# Patient Record
Sex: FEMALE | URBAN - METROPOLITAN AREA
[De-identification: names, ages, dates, MRNs, and addresses within clinical notes are randomized per-mention and may not be internally consistent; named-entity substitution may affect disease eponyms.]

---

## 2020-08-28 ENCOUNTER — HOSPITAL ENCOUNTER (OUTPATIENT)
Facility: MEDICAL CENTER | Age: 25
End: 2020-08-28
Attending: SPECIALIST
Payer: MEDICAID

## 2020-08-28 PROCEDURE — 87491 CHLMYD TRACH DNA AMP PROBE: CPT

## 2020-08-28 PROCEDURE — 87591 N.GONORRHOEAE DNA AMP PROB: CPT

## 2020-08-28 PROCEDURE — 88175 CYTOPATH C/V AUTO FLUID REDO: CPT

## 2020-08-28 PROCEDURE — 87624 HPV HI-RISK TYP POOLED RSLT: CPT

## 2020-08-31 LAB
C TRACH DNA GENITAL QL NAA+PROBE: NEGATIVE
CYTOLOGY REG CYTOL: NORMAL
HPV HR 12 DNA CVX QL NAA+PROBE: NEGATIVE
HPV16 DNA SPEC QL NAA+PROBE: NEGATIVE
HPV18 DNA SPEC QL NAA+PROBE: NEGATIVE
N GONORRHOEA DNA GENITAL QL NAA+PROBE: NEGATIVE
SPECIMEN SOURCE: NORMAL
SPECIMEN SOURCE: NORMAL

## 2023-01-23 ENCOUNTER — TELEPHONE (OUTPATIENT)
Dept: SCHEDULING | Facility: IMAGING CENTER | Age: 28
End: 2023-01-23

## 2023-01-31 ENCOUNTER — OFFICE VISIT (OUTPATIENT)
Dept: MEDICAL GROUP | Facility: CLINIC | Age: 28
End: 2023-01-31
Payer: MEDICAID

## 2023-01-31 VITALS
DIASTOLIC BLOOD PRESSURE: 60 MMHG | RESPIRATION RATE: 16 BRPM | WEIGHT: 138 LBS | SYSTOLIC BLOOD PRESSURE: 100 MMHG | OXYGEN SATURATION: 100 % | HEART RATE: 102 BPM | HEIGHT: 62 IN | BODY MASS INDEX: 25.4 KG/M2 | TEMPERATURE: 97.6 F

## 2023-01-31 DIAGNOSIS — E55.9 VITAMIN D DEFICIENCY: ICD-10-CM

## 2023-01-31 DIAGNOSIS — G43.909 MIGRAINE WITHOUT STATUS MIGRAINOSUS, NOT INTRACTABLE, UNSPECIFIED MIGRAINE TYPE: ICD-10-CM

## 2023-01-31 DIAGNOSIS — Z00.00 ROUTINE PHYSICAL EXAMINATION: ICD-10-CM

## 2023-01-31 DIAGNOSIS — Z23 NEED FOR VACCINATION: ICD-10-CM

## 2023-01-31 DIAGNOSIS — Z11.59 ENCOUNTER FOR HEPATITIS C SCREENING TEST FOR LOW RISK PATIENT: ICD-10-CM

## 2023-01-31 PROBLEM — R44.1 HALLUCINATIONS, VISUAL: Status: RESOLVED | Noted: 2017-06-14 | Resolved: 2023-01-31

## 2023-01-31 PROBLEM — F43.10 POSTTRAUMATIC STRESS DISORDER: Status: ACTIVE | Noted: 2017-06-14

## 2023-01-31 PROBLEM — F43.10 POSTTRAUMATIC STRESS DISORDER: Status: RESOLVED | Noted: 2017-06-14 | Resolved: 2023-01-31

## 2023-01-31 PROBLEM — R44.1 HALLUCINATIONS, VISUAL: Status: ACTIVE | Noted: 2017-06-14

## 2023-01-31 PROBLEM — J45.990 EXERCISE-INDUCED ASTHMA: Status: ACTIVE | Noted: 2017-06-14

## 2023-01-31 PROCEDURE — 99204 OFFICE O/P NEW MOD 45 MIN: CPT | Mod: 25 | Performed by: PHYSICIAN ASSISTANT

## 2023-01-31 PROCEDURE — 90686 IIV4 VACC NO PRSV 0.5 ML IM: CPT | Performed by: PHYSICIAN ASSISTANT

## 2023-01-31 PROCEDURE — 90472 IMMUNIZATION ADMIN EACH ADD: CPT | Performed by: PHYSICIAN ASSISTANT

## 2023-01-31 PROCEDURE — 90677 PCV20 VACCINE IM: CPT | Performed by: PHYSICIAN ASSISTANT

## 2023-01-31 PROCEDURE — 90471 IMMUNIZATION ADMIN: CPT | Performed by: PHYSICIAN ASSISTANT

## 2023-01-31 RX ORDER — SUMATRIPTAN 50 MG/1
50 TABLET, FILM COATED ORAL
Qty: 10 TABLET | Refills: 0 | Status: SHIPPED | OUTPATIENT
Start: 2023-01-31 | End: 2023-02-14

## 2023-01-31 RX ORDER — LEVONORGESTREL AND ETHINYL ESTRADIOL 0.15-0.03
KIT ORAL
COMMUNITY
Start: 2023-01-19

## 2023-01-31 ASSESSMENT — PATIENT HEALTH QUESTIONNAIRE - PHQ9: CLINICAL INTERPRETATION OF PHQ2 SCORE: 0

## 2023-01-31 NOTE — PROGRESS NOTES
Chief Complaint   Patient presents with    [facility]     Headache behind R eye, 15 days       HPI:  Ghanshyam is a 27 y.o. female new patient presenting to [facility] and discuss the following:    Assessment/Plan:     Migraine without aura and without status migrainosus, not intractable  Patient states that she has had a headache behind her right eye and above her right eye for 15 days. She has tried multiple OTC pain relievers without improvement of symptoms. She states that sometimes an ice pack to the right side of her head will help ease the pain some but it comes right back. She states that this pain has not stopped at all in the last 15 days. She has no history of migraines but does have a history of Bell's palsy as a child. Will sive her some sumatriptan to see if that will help relieve the pain. CT with and without contrast has been ordered to see if there are any additional causes that can be found. She will follow up to review the results of the CT. She has been instructed that if the Imitrex does not help then she should stop taking it. Will consider referral to neurology after CT scan is complete.    Routine physical examination  Patient here today to [facility]. Due for routine fasting labs. Will follow up in 2 weeks with results.  States no previous PCP previous PCP. No medical records requested.      Patient Active Problem List    Diagnosis Date Noted    Routine physical examination 01/31/2023    Seasonal allergies     Anxiety     Endometriosis     History of Bell's palsy     Migraine without aura and without status migrainosus, not intractable     Exercise-induced asthma 06/14/2017       Current Outpatient Medications   Medication Sig Dispense Refill    KURVELO 0.15-30 MG-MCG per tablet TAKE 1 TABLET BY MOUTH ONCE DAILY FOR 63 DAYS . TAKE 21 DAYS OF ACTIVE TABS, THEN SKIP PLACEBOS AND START NEXT PACK      SUMAtriptan (IMITREX) 50 MG Tab Take 1 Tablet by mouth one time as needed for Migraine  for up to 1 dose. 10 Tablet 0     No current facility-administered medications for this visit.       Allergies as of 01/31/2023 - Reviewed 01/31/2023   Allergen Reaction Noted    Avocado Itching 01/31/2023        Social History     Socioeconomic History    Marital status: Single     Spouse name: Not on file    Number of children: 0    Years of education: Not on file    Highest education level: High school graduate   Occupational History    Occupation: [employer]     Employer: Self Employed   Tobacco Use    Smoking status: Some Days     Types: Cigarettes    Smokeless tobacco: Never   Vaping Use    Vaping Use: Never used   Substance and Sexual Activity    Alcohol use: Yes     Comment: occasionally    Drug use: Yes     Frequency: 21.0 times per week     Types: Marijuana     Comment: smokes a tobacco and marijuana mix    Sexual activity: Yes     Partners: Male   Other Topics Concern    Not on file   Social History Narrative    Not on file     Social Determinants of Health     Financial Resource Strain: Not on file   Food Insecurity: Not on file   Transportation Needs: Not on file   Physical Activity: Not on file   Stress: Not on file   Social Connections: Not on file   Intimate Partner Violence: Not on file   Housing Stability: Not on file       Family History   Problem Relation Age of Onset    Heart Disease Mother         MVP    Heart Disease Maternal Grandfather     Diabetes Paternal Grandfather     Cancer Neg Hx        Past Surgical History:   Procedure Laterality Date    GYN LAPAROSCOPY  08/01/2018    to diagnose and treat endometriosis    DENTAL EXTRACTION(S) Bilateral     wisdom teeth extraction       Review of Systems:   Constitutional: Negative for fever, chills, weight loss and malaise/fatigue.   HENT: Negative for ear pain, nosebleeds, congestion, sore throat and neck pain.    Eyes: Negative for blurred vision.   Respiratory: Negative for cough, sputum production, shortness of breath and wheezing.    Cardiovascular:  "Negative for chest pain, palpitations, orthopnea and leg swelling.   Gastrointestinal: Negative for heartburn, nausea, vomiting and abdominal pain.   Genitourinary: Negative for dysuria, urgency and frequency.   Musculoskeletal: Negative for myalgias, back pain and joint pain.   Skin: Negative for rash and itching.   Neurological: Positive for headache behind right eye for 15 days. Negative for dizziness, tingling, tremors, sensory change, focal weakness.   Endo/Heme/Allergies: Does not bruise/bleed easily.   Psychiatric/Behavioral: Negative for depression, suicidal ideas and memory loss.  The patient is not nervous/anxious and does not have insomnia.    All other systems reviewed and are negative except as in HPI.    Wt Readings from Last 3 Encounters:   01/31/23 62.6 kg (138 lb)   ]  No LMP recorded (lmp unknown). (Menstrual status: Other).    Exam:  /60 (BP Location: Left arm, Patient Position: Sitting, BP Cuff Size: Small adult)   Pulse (!) 102   Temp 36.4 °C (97.6 °F) (Temporal)   Resp 16   Ht 1.575 m (5' 2\")   Wt 62.6 kg (138 lb)   SpO2 100%  Body mass index is 25.24 kg/m².   General:  Well nourished, well developed female. No apparent distress. Not ill appearing.  Eyes: EOM intact, PERRL, conjunctiva non-injected, sclera non-icteric.  Neck: Supple with no cervical lymphadenopathy, JVD, palpable thyroid nodules or carotid bruits.  Pulmonary: Clear to ausculation bilaterally. Normal effort. No rales, ronchi, or wheezing.  Cardiovascular: Regular rate and rhythm without murmur, rub or gallop.   Extremities: Full range of motion. Warm and well perfused with no edema.  Skin: Intact with no obvious rashes or lesions.  Neuro: Cranial nerves I-XII grossly intact.  Psych: Alert and oriented x 3.  Appropriately dressed. Mood and affect appropriate.    Please note that this dictation was created using voice recognition software. I have made every reasonable attempt to correct obvious errors, but I expect " that there are errors of grammar and possibly content that I did not discover before finalizing the note.

## 2023-02-01 ENCOUNTER — HOSPITAL ENCOUNTER (OUTPATIENT)
Dept: LAB | Facility: MEDICAL CENTER | Age: 28
End: 2023-02-01
Attending: PHYSICIAN ASSISTANT
Payer: MEDICAID

## 2023-02-01 DIAGNOSIS — G43.909 MIGRAINE WITHOUT STATUS MIGRAINOSUS, NOT INTRACTABLE, UNSPECIFIED MIGRAINE TYPE: ICD-10-CM

## 2023-02-01 DIAGNOSIS — E55.9 VITAMIN D DEFICIENCY: ICD-10-CM

## 2023-02-01 DIAGNOSIS — Z00.00 ROUTINE PHYSICAL EXAMINATION: ICD-10-CM

## 2023-02-01 DIAGNOSIS — Z11.59 ENCOUNTER FOR HEPATITIS C SCREENING TEST FOR LOW RISK PATIENT: ICD-10-CM

## 2023-02-01 LAB
25(OH)D3 SERPL-MCNC: 24 NG/ML (ref 30–100)
ALBUMIN SERPL BCP-MCNC: 4.5 G/DL (ref 3.2–4.9)
ALBUMIN/GLOB SERPL: 1.4 G/DL
ALP SERPL-CCNC: 50 U/L (ref 30–99)
ALT SERPL-CCNC: 20 U/L (ref 2–50)
ANION GAP SERPL CALC-SCNC: 10 MMOL/L (ref 7–16)
AST SERPL-CCNC: 13 U/L (ref 12–45)
BASOPHILS # BLD AUTO: 0.6 % (ref 0–1.8)
BASOPHILS # BLD: 0.05 K/UL (ref 0–0.12)
BILIRUB SERPL-MCNC: 0.4 MG/DL (ref 0.1–1.5)
BUN SERPL-MCNC: 20 MG/DL (ref 8–22)
CALCIUM ALBUM COR SERPL-MCNC: 9 MG/DL (ref 8.5–10.5)
CALCIUM SERPL-MCNC: 9.4 MG/DL (ref 8.5–10.5)
CHLORIDE SERPL-SCNC: 110 MMOL/L (ref 96–112)
CO2 SERPL-SCNC: 21 MMOL/L (ref 20–33)
CREAT SERPL-MCNC: 0.88 MG/DL (ref 0.5–1.4)
EOSINOPHIL # BLD AUTO: 0.15 K/UL (ref 0–0.51)
EOSINOPHIL NFR BLD: 1.7 % (ref 0–6.9)
ERYTHROCYTE [DISTWIDTH] IN BLOOD BY AUTOMATED COUNT: 47.9 FL (ref 35.9–50)
GFR SERPLBLD CREATININE-BSD FMLA CKD-EPI: 92 ML/MIN/1.73 M 2
GLOBULIN SER CALC-MCNC: 3.3 G/DL (ref 1.9–3.5)
GLUCOSE SERPL-MCNC: 86 MG/DL (ref 65–99)
HCT VFR BLD AUTO: 46.6 % (ref 37–47)
HCV AB SER QL: NORMAL
HGB BLD-MCNC: 15.5 G/DL (ref 12–16)
IMM GRANULOCYTES # BLD AUTO: 0.02 K/UL (ref 0–0.11)
IMM GRANULOCYTES NFR BLD AUTO: 0.2 % (ref 0–0.9)
LYMPHOCYTES # BLD AUTO: 2.39 K/UL (ref 1–4.8)
LYMPHOCYTES NFR BLD: 26.9 % (ref 22–41)
MCH RBC QN AUTO: 32 PG (ref 27–33)
MCHC RBC AUTO-ENTMCNC: 33.3 G/DL (ref 33.6–35)
MCV RBC AUTO: 96.3 FL (ref 81.4–97.8)
MONOCYTES # BLD AUTO: 0.36 K/UL (ref 0–0.85)
MONOCYTES NFR BLD AUTO: 4 % (ref 0–13.4)
NEUTROPHILS # BLD AUTO: 5.93 K/UL (ref 2–7.15)
NEUTROPHILS NFR BLD: 66.6 % (ref 44–72)
NRBC # BLD AUTO: 0 K/UL
NRBC BLD-RTO: 0 /100 WBC
PLATELET # BLD AUTO: 171 K/UL (ref 164–446)
PMV BLD AUTO: 9.8 FL (ref 9–12.9)
POTASSIUM SERPL-SCNC: 4.3 MMOL/L (ref 3.6–5.5)
PROT SERPL-MCNC: 7.8 G/DL (ref 6–8.2)
RBC # BLD AUTO: 4.84 M/UL (ref 4.2–5.4)
SODIUM SERPL-SCNC: 141 MMOL/L (ref 135–145)
T4 FREE SERPL-MCNC: 1.26 NG/DL (ref 0.93–1.7)
TSH SERPL DL<=0.005 MIU/L-ACNC: 1.7 UIU/ML (ref 0.38–5.33)
WBC # BLD AUTO: 8.9 K/UL (ref 4.8–10.8)

## 2023-02-01 PROCEDURE — 82306 VITAMIN D 25 HYDROXY: CPT

## 2023-02-01 PROCEDURE — 86803 HEPATITIS C AB TEST: CPT

## 2023-02-01 PROCEDURE — 36415 COLL VENOUS BLD VENIPUNCTURE: CPT

## 2023-02-01 PROCEDURE — 84439 ASSAY OF FREE THYROXINE: CPT

## 2023-02-01 PROCEDURE — 80053 COMPREHEN METABOLIC PANEL: CPT

## 2023-02-01 PROCEDURE — 84443 ASSAY THYROID STIM HORMONE: CPT

## 2023-02-01 PROCEDURE — 85025 COMPLETE CBC W/AUTO DIFF WBC: CPT

## 2023-02-01 NOTE — ASSESSMENT & PLAN NOTE
Patient states that she has had a headache behind her right eye and above her right eye for 15 days. She has tried multiple OTC pain relievers without improvement of symptoms. She states that sometimes an ice pack to the right side of her head will help ease the pain some but it comes right back. She states that this pain has not stopped at all in the last 15 days. She has no history of migraines but does have a history of Bell's palsy as a child. Will sive her some sumatriptan to see if that will help relieve the pain. CT with and without contrast has been ordered to see if there are any additional causes that can be found. She will follow up to review the results of the CT. She has been instructed that if the Imitrex does not help then she should stop taking it. Will consider referral to neurology after CT scan is complete.

## 2023-02-01 NOTE — ASSESSMENT & PLAN NOTE
Patient here today to establish care. Due for routine fasting labs. Will follow up in 2 weeks with results.  States no previous PCP previous PCP. No medical records requested.

## 2023-02-08 ENCOUNTER — HOSPITAL ENCOUNTER (OUTPATIENT)
Dept: RADIOLOGY | Facility: MEDICAL CENTER | Age: 28
End: 2023-02-08
Attending: PHYSICIAN ASSISTANT
Payer: MEDICAID

## 2023-02-08 DIAGNOSIS — G43.909 MIGRAINE WITHOUT STATUS MIGRAINOSUS, NOT INTRACTABLE, UNSPECIFIED MIGRAINE TYPE: ICD-10-CM

## 2023-02-08 PROCEDURE — 70470 CT HEAD/BRAIN W/O & W/DYE: CPT

## 2023-02-08 PROCEDURE — 700117 HCHG RX CONTRAST REV CODE 255: Performed by: PHYSICIAN ASSISTANT

## 2023-02-08 RX ADMIN — IOHEXOL 50 ML: 350 INJECTION, SOLUTION INTRAVENOUS at 13:55

## 2023-02-14 ENCOUNTER — OFFICE VISIT (OUTPATIENT)
Dept: MEDICAL GROUP | Facility: CLINIC | Age: 28
End: 2023-02-14
Payer: MEDICAID

## 2023-02-14 VITALS
OXYGEN SATURATION: 99 % | RESPIRATION RATE: 20 BRPM | WEIGHT: 135.2 LBS | SYSTOLIC BLOOD PRESSURE: 112 MMHG | HEIGHT: 63 IN | DIASTOLIC BLOOD PRESSURE: 70 MMHG | BODY MASS INDEX: 23.96 KG/M2 | HEART RATE: 99 BPM | TEMPERATURE: 99.1 F

## 2023-02-14 DIAGNOSIS — E55.9 VITAMIN D DEFICIENCY: ICD-10-CM

## 2023-02-14 DIAGNOSIS — G43.009 MIGRAINE WITHOUT AURA AND WITHOUT STATUS MIGRAINOSUS, NOT INTRACTABLE: ICD-10-CM

## 2023-02-14 DIAGNOSIS — G43.909 MIGRAINE WITHOUT STATUS MIGRAINOSUS, NOT INTRACTABLE, UNSPECIFIED MIGRAINE TYPE: ICD-10-CM

## 2023-02-14 PROCEDURE — 99214 OFFICE O/P EST MOD 30 MIN: CPT | Performed by: PHYSICIAN ASSISTANT

## 2023-02-14 RX ORDER — SUMATRIPTAN 100 MG/1
50 TABLET, FILM COATED ORAL
Qty: 10 TABLET | Refills: 1 | Status: SHIPPED | OUTPATIENT
Start: 2023-02-14

## 2023-02-14 ASSESSMENT — FIBROSIS 4 INDEX: FIB4 SCORE: 0.46

## 2023-02-14 NOTE — PROGRESS NOTES
Chief Complaint   Patient presents with    Results     Labs and ct        HISTORY OF PRESENT ILLNESS: Patient is a 27 y.o. female established patient who presents today to discuss the following issues:    Assessment/Plan  Vitamin D deficiency  Labs show patient is low on vitamin D.  Current vitamin D level is 24.  Start taking vitamin D3 2000 units per day. Recheck labs in one year.    Migraine without aura and without status migrainosus, not intractable  Patient had CT of her head with and without contrast for evaluation of worsening headaches.  The CT is within normal limits.  No cerebral infarct, hemorrhage, mass lesion or abnormal enhancement noted.  She tried the sumatriptan but noted that the 50 mg did nothing for her headache.  We will increase her dose to 100 mg and send new prescription to the pharmacy.  If this does not control her headaches we will consider switching to a different medication.      Reviewed risks and benefits of treatment plan. Patient verbally agrees to plan of care.     Patient Active Problem List    Diagnosis Date Noted    Vitamin D deficiency 03/01/2023    Routine physical examination 01/31/2023    Seasonal allergies     Anxiety     Endometriosis     History of Bell's palsy     Migraine without aura and without status migrainosus, not intractable     Exercise-induced asthma 06/14/2017       Allergies:Avocado    Current Outpatient Medications   Medication Sig Dispense Refill    SUMAtriptan (IMITREX) 100 MG tablet Take 0.5 Tablets by mouth one time as needed for Migraine for up to 1 dose. 10 Tablet 1    KURVELO 0.15-30 MG-MCG per tablet TAKE 1 TABLET BY MOUTH ONCE DAILY FOR 63 DAYS . TAKE 21 DAYS OF ACTIVE TABS, THEN SKIP PLACEBOS AND START NEXT PACK       No current facility-administered medications for this visit.       Wt Readings from Last 3 Encounters:   02/14/23 61.3 kg (135 lb 3.2 oz)   01/31/23 62.6 kg (138 lb)   ]  No LMP recorded (lmp unknown). (Menstrual status:  "Other).    Exam:  /70 (BP Location: Left arm, Patient Position: Sitting, BP Cuff Size: Adult)   Pulse 99   Temp 37.3 °C (99.1 °F) (Temporal)   Resp 20   Ht 1.6 m (5' 3\")   Wt 61.3 kg (135 lb 3.2 oz)   SpO2 99%  Body mass index is 23.95 kg/m².   General:  Well nourished, well developed female. No apparent distress. Not ill appearing.  Eyes: EOM intact, PERRL, conjunctiva non-injected, sclera non-icteric.  Neck: Supple with no cervical lymphadenopathy, JVD, palpable thyroid nodules or carotid bruits.  Pulmonary: Clear to ausculation bilaterally. Normal effort. No rales, ronchi, or wheezing.  Cardiovascular: Regular rate and rhythm without murmur, rub or gallop.   Extremities: Full range of motion. Warm and well perfused with no edema.  Skin: Intact with no obvious rashes or lesions.  Neuro: Cranial nerves I-XII grossly intact.  Psych: Alert and oriented x 3.  Appropriately dressed. Mood and affect appropriate.    Return in about 1 year (around 2/14/2024).  Please note that this dictation was created using voice recognition software. I have made every reasonable attempt to correct obvious errors, but I expect that there are errors of grammar and possibly content that I did not discover before finalizing the note.    "

## 2023-03-01 PROBLEM — E55.9 VITAMIN D DEFICIENCY: Status: ACTIVE | Noted: 2023-03-01

## 2023-03-02 NOTE — ASSESSMENT & PLAN NOTE
Labs show patient is low on vitamin D.  Current vitamin D level is 24.  Start taking vitamin D3 2000 units per day. Recheck labs in one year.

## 2023-03-02 NOTE — ASSESSMENT & PLAN NOTE
Patient had CT of her head with and without contrast for evaluation of worsening headaches.  The CT is within normal limits.  No cerebral infarct, hemorrhage, mass lesion or abnormal enhancement noted.  She tried the sumatriptan but noted that the 50 mg did nothing for her headache.  We will increase her dose to 100 mg and send new prescription to the pharmacy.  If this does not control her headaches we will consider switching to a different medication.

## 2023-05-09 ENCOUNTER — OFFICE VISIT (OUTPATIENT)
Dept: MEDICAL GROUP | Facility: CLINIC | Age: 28
End: 2023-05-09
Payer: MEDICAID

## 2023-05-09 VITALS
RESPIRATION RATE: 18 BRPM | DIASTOLIC BLOOD PRESSURE: 78 MMHG | TEMPERATURE: 98.6 F | SYSTOLIC BLOOD PRESSURE: 110 MMHG | WEIGHT: 135.4 LBS | OXYGEN SATURATION: 97 % | HEIGHT: 63 IN | HEART RATE: 85 BPM | BODY MASS INDEX: 23.99 KG/M2

## 2023-05-09 DIAGNOSIS — F41.9 ANXIETY: ICD-10-CM

## 2023-05-09 DIAGNOSIS — D22.9 CHANGE IN SKIN MOLE: ICD-10-CM

## 2023-05-09 PROCEDURE — 99212 OFFICE O/P EST SF 10 MIN: CPT | Performed by: PHYSICIAN ASSISTANT

## 2023-05-09 ASSESSMENT — FIBROSIS 4 INDEX: FIB4 SCORE: 0.46

## 2023-05-09 NOTE — PROGRESS NOTES
Chief Complaint   Patient presents with    Nevus     Mole with dot in the middle of it.        HISTORY OF PRESENT ILLNESS: Patient is a 27 y.o. female established patient who presents today to discuss the following issues:    Assessment/Plan  Change in skin mole  Patient has multiple moles over body surface. She has one that she is concerned about due to it developing a dark spot within the body of the mole at the 12 O'clock position. Mole is on right upper shoulder. She is very worried about developing cancer as she read that she should be checked if she has any changing moles. Current mole does not appear concerning to this provider but out of an abundance of caution and to ease the fears of the patient we will place referral to dermatology for further evaluation. Patient is fair skinned and has light hair and a history of sunburns as a child. She will follow up after her appointment with dermatology.    Anxiety  Her overall anxiety is making her worry about this changing mole. This is making her anxiety worse. She does not want to take medication for the anxiety. She just wants to have the mole checked.      Reviewed risks and benefits of treatment plan. Patient verbally agrees to plan of care.     Patient Active Problem List    Diagnosis Date Noted    Change in skin mole 05/09/2023    Vitamin D deficiency 03/01/2023    Routine physical examination 01/31/2023    Seasonal allergies     Anxiety     Endometriosis     History of Bell's palsy     Migraine without aura and without status migrainosus, not intractable     Exercise-induced asthma 06/14/2017       Allergies:Avocado    Current Outpatient Medications   Medication Sig Dispense Refill    KURVELO 0.15-30 MG-MCG per tablet TAKE 1 TABLET BY MOUTH ONCE DAILY FOR 63 DAYS . TAKE 21 DAYS OF ACTIVE TABS, THEN SKIP PLACEBOS AND START NEXT PACK      SUMAtriptan (IMITREX) 100 MG tablet Take 0.5 Tablets by mouth one time as needed for Migraine for up to 1 dose. (Patient  "not taking: Reported on 5/9/2023) 10 Tablet 1     No current facility-administered medications for this visit.       Wt Readings from Last 3 Encounters:   05/09/23 61.4 kg (135 lb 6.4 oz)   02/14/23 61.3 kg (135 lb 3.2 oz)   01/31/23 62.6 kg (138 lb)   ]  No LMP recorded. (Menstrual status: Other).    Exam:  /78 (BP Location: Left arm, Patient Position: Sitting, BP Cuff Size: Small adult)   Pulse 85   Temp 37 °C (98.6 °F) (Temporal)   Resp 18   Ht 1.6 m (5' 3\")   Wt 61.4 kg (135 lb 6.4 oz)   SpO2 97%  Body mass index is 23.99 kg/m².   General:  Well nourished, well developed female. No apparent distress. Not ill appearing.  Eyes: EOM intact, PERRL, conjunctiva non-injected, sclera non-icteric.  Neck: Supple with no cervical lymphadenopathy, JVD, palpable thyroid nodules or carotid bruits.  Pulmonary: Clear to ausculation bilaterally. Normal effort. No rales, rhonchi, or wheezing.  Cardiovascular: Regular rate and rhythm without murmur, rub or gallop.   Extremities: Full range of motion. Warm and well perfused with no edema.  Skin: Intact with no obvious rashes or lesions. Multiple moles over body.  Neuro: Cranial nerves I-XII grossly intact.  Psych: Alert and oriented x 3.  Appropriately dressed. Mood and affect appropriate.    Return if symptoms worsen or fail to improve.  Please note that this dictation was created using voice recognition software. I have made every reasonable attempt to correct obvious errors, but I expect that there are errors of grammar and possibly content that I did not discover before finalizing the note.    "

## 2023-05-18 NOTE — ASSESSMENT & PLAN NOTE
Patient has multiple moles over body surface. She has one that she is concerned about due to it developing a dark spot within the body of the mole at the 12 O'clock position. Mole is on right upper shoulder. She is very worried about developing cancer as she read that she should be checked if she has any changing moles. Current mole does not appear concerning to this provider but out of an abundance of caution and to ease the fears of the patient we will place referral to dermatology for further evaluation. Patient is fair skinned and has light hair and a history of sunburns as a child. She will follow up after her appointment with dermatology.

## 2023-05-18 NOTE — ASSESSMENT & PLAN NOTE
Her overall anxiety is making her worry about this changing mole. This is making her anxiety worse. She does not want to take medication for the anxiety. She just wants to have the mole checked.

## 2023-10-27 ENCOUNTER — DOCUMENTATION (OUTPATIENT)
Dept: HEALTH INFORMATION MANAGEMENT | Facility: OTHER | Age: 28
End: 2023-10-27
Payer: MEDICAID

## 2023-10-31 ENCOUNTER — TELEPHONE (OUTPATIENT)
Dept: HEALTH INFORMATION MANAGEMENT | Facility: OTHER | Age: 28
End: 2023-10-31
Payer: MEDICAID